# Patient Record
Sex: FEMALE | URBAN - METROPOLITAN AREA
[De-identification: names, ages, dates, MRNs, and addresses within clinical notes are randomized per-mention and may not be internally consistent; named-entity substitution may affect disease eponyms.]

---

## 2018-06-25 ENCOUNTER — APPOINTMENT (RX ONLY)
Dept: URBAN - METROPOLITAN AREA CLINIC 126 | Facility: CLINIC | Age: 48
Setting detail: DERMATOLOGY
End: 2018-06-25

## 2018-06-25 DIAGNOSIS — Z41.9 ENCOUNTER FOR PROCEDURE FOR PURPOSES OTHER THAN REMEDYING HEALTH STATE, UNSPECIFIED: ICD-10-CM

## 2018-06-25 PROCEDURE — ? MEDICAL CONSULTATION: BROWN SPOTS

## 2018-06-25 PROCEDURE — 99025: CPT

## 2018-06-25 PROCEDURE — ? MEDICAL CONSULTATION: DYNAMIC RHYTIDES

## 2018-06-25 PROCEDURE — ? MEDICAL CONSULTATION: FILLERS

## 2018-06-25 PROCEDURE — ? RECOMMENDATIONS

## 2019-12-02 ENCOUNTER — APPOINTMENT (RX ONLY)
Dept: URBAN - METROPOLITAN AREA CLINIC 126 | Facility: CLINIC | Age: 49
Setting detail: DERMATOLOGY
End: 2019-12-02

## 2019-12-02 DIAGNOSIS — Z41.9 ENCOUNTER FOR PROCEDURE FOR PURPOSES OTHER THAN REMEDYING HEALTH STATE, UNSPECIFIED: ICD-10-CM

## 2019-12-02 PROCEDURE — ? BOTOX

## 2019-12-02 PROCEDURE — ? FILLERS

## 2019-12-02 NOTE — PROCEDURE: FILLERS
Additional Area 4 Volume In Cc: 0
Include Cannula Information In Note?: No
Use Map Statement For Sites (Optional): Yes
Lot #: [id number]
Additional Area 1 Location: lip lines upper and lower
Additional Area 1 Location: earlobes
Expiration Date (Month Year): 7/31/2021
Map Statment: See [address] for Complete Details
Additional Area 1 Location: lip lines
Additional Area 2 Location: lip volume
Additional Area 2 Volume In Cc: 0.4
Additional Area 2 Location: upper lip lines
Additional Area 3 Location: lip borders
Filler: Juvederm Ultra XC
Additional Area 3 Location: corners of mouth
Additional Area 4 Location: corners
Additional Area 4 Location: chin lines
Additional Area 5 Location: static lines
Nasolabial Folds Filler Volume In Cc: 0.2
Additional Area 5 Location: lower face lines
Consent: Written consent obtained. Risks include but not limited to bruising, beading, irregular texture, ulceration, infection, allergic reaction, scar formation, incomplete augmentation, temporary nature, procedural pain.
Lot #: [id number]
Post-Care Instructions: Patient instructed to apply ice to reduce swelling.
Detail Level: Zone
Lot #: [id number]
Lot #: [id number]
Expiration Date (Month Year): 10/03/2020
Expiration Date (Month Year): 10/16/20
Expiration Date (Month Year): 4/20/2020

## 2019-12-02 NOTE — PROCEDURE: BOTOX
Show Additional Area 3: Yes
Right Periorbital Units: 0
Show Mentalis Units: No
Expiration Date (Month Year): 4/2022
Additional Area 1 Location: brow lift
Post-Care Instructions: Patient instructed to not lie down for 4 hours and limit physical activity for 24 hours. Patient instructed not to travel by airplane for 48 hours.
Forehead Units: 10
Additional Area 4 Location: upper lip lines
Dilution (U/0.1 Cc): 1
Additional Area 6 Location: upper forehead
Additional Area 3 Location: bunnies
Additional Area 2 Location: crows feet
Consent: Written consent obtained. Risks include but not limited to lid/brow ptosis, bruising, swelling, diplopia, temporary effect, incomplete chemical denervation.
Additional Area 5 Location: parentheses
Detail Level: Simple
Lot #: [id number]

## 2019-12-16 ENCOUNTER — APPOINTMENT (RX ONLY)
Dept: URBAN - METROPOLITAN AREA CLINIC 126 | Facility: CLINIC | Age: 49
Setting detail: DERMATOLOGY
End: 2019-12-16

## 2019-12-16 DIAGNOSIS — Z41.9 ENCOUNTER FOR PROCEDURE FOR PURPOSES OTHER THAN REMEDYING HEALTH STATE, UNSPECIFIED: ICD-10-CM

## 2019-12-16 PROCEDURE — ? COSMETIC FOLLOW-UP

## 2021-03-17 ENCOUNTER — APPOINTMENT (RX ONLY)
Dept: URBAN - METROPOLITAN AREA CLINIC 126 | Facility: CLINIC | Age: 51
Setting detail: DERMATOLOGY
End: 2021-03-17

## 2021-03-17 DIAGNOSIS — Z41.9 ENCOUNTER FOR PROCEDURE FOR PURPOSES OTHER THAN REMEDYING HEALTH STATE, UNSPECIFIED: ICD-10-CM

## 2021-03-17 PROCEDURE — ? BOTOX

## 2021-03-17 PROCEDURE — ? FILLERS

## 2021-03-17 NOTE — PROCEDURE: BOTOX
Right Pupillary Line Units: 0
Show Additional Area 4: Yes
Detail Level: Simple
Additional Area 1 Units: 4
Dilution (U/0.1 Cc): 1
Additional Area 3 Location: bunnies
Show Ucl Units: No
Additional Area 6 Location: axillae
Additional Area 5 Location: lateral forehead
Periorbital Skin Units: [address] [address]
Consent: Written consent obtained. Risks include but not limited to lid/brow ptosis, bruising, swelling, diplopia, temporary effect, incomplete chemical denervation.
Additional Area 2 Location: parentheses
Forehead Units: 8
Additional Area 4 Location: upper lip lines
Additional Area 1 Location: brow lift
Lot #: [id number]
Expiration Date (Month Year): 3/2023
Post-Care Instructions: Patient instructed to not lie down for 4 hours and limit physical activity for 24 hours. Patient instructed not to travel by airplane for 48 hours.

## 2021-03-17 NOTE — PROCEDURE: FILLERS
Additional Area 4 Volume In Cc: 0
Include Cannula Information In Note?: No
Use Map Statement For Sites (Optional): Yes
Lot #: [id number]
Additional Area 1 Location: lip lines upper
Additional Area 1 Volume In Cc: 0.2
Additional Area 1 Location: earlobes
Expiration Date (Month Year): 7/31/2021
Map Statment: See [address] for Complete Details
Additional Area 1 Location: lip lines
Additional Area 2 Location: lip volume
Additional Area 2 Location: upper lip lines
Additional Area 3 Location: lip borders
Filler: Juvederm Ultra XC
Additional Area 3 Location: corners of mouth
Additional Area 4 Location: corners
Additional Area 4 Location: chin lines
Additional Area 5 Location: smile lines
Additional Area 5 Location: lower face lines
Consent: Written consent obtained. Risks include but not limited to bruising, beading, irregular texture, ulceration, infection, allergic reaction, scar formation, incomplete augmentation, temporary nature, procedural pain.
Lot #: [id number]
Post-Care Instructions: Patient instructed to apply ice to reduce swelling.
Detail Level: Zone
Lot #: [id number]
Lot #: [id number]
Expiration Date (Month Year): 2/08/2022
Expiration Date (Month Year): 10/16/20
Expiration Date (Month Year): 4/20/2020

## 2021-12-20 ENCOUNTER — APPOINTMENT (RX ONLY)
Dept: URBAN - METROPOLITAN AREA CLINIC 126 | Facility: CLINIC | Age: 51
Setting detail: DERMATOLOGY
End: 2021-12-20

## 2021-12-20 DIAGNOSIS — L81.4 OTHER MELANIN HYPERPIGMENTATION: ICD-10-CM

## 2021-12-20 DIAGNOSIS — Z41.9 ENCOUNTER FOR PROCEDURE FOR PURPOSES OTHER THAN REMEDYING HEALTH STATE, UNSPECIFIED: ICD-10-CM

## 2021-12-20 PROCEDURE — ? DIAGNOSIS COMMENT

## 2021-12-20 PROCEDURE — ? FILLERS

## 2021-12-20 NOTE — PROCEDURE: FILLERS
Vermilion Lips Filler Volume In Cc: 0
Topical Anesthesia?: BLT cream (benzocaine 20%, lidocaine 6%, tetracaine 4%)
Additional Area 1 Location: corners
Lot #: [id number]
Include Cannula Information In Note?: No
Additional Area 1 Volume In Cc: 0.2
Additional Area 2 Location: smile lines
Consent: Written consent obtained. Risks include but not limited to bruising, beading, irregular texture, ulceration, infection, allergic reaction, scar formation, incomplete augmentation, temporary nature, procedural pain.
Expiration Date (Month Year): 7/7/2022
Lot #: [id number]
Filler: Radiesse+
Post-Care Instructions: Patient instructed to apply ice to reduce swelling.
Additional Area 3 Location: lip volume
Expiration Date (Month Year): 2/08/2022
Lot #: [id number]
Additional Area 4 Location: chin
Expiration Date (Month Year): 7/31/2021
Additional Area 5 Location: upper and lower lip lines
Detail Level: Zone
Lot #: [id number]
Additional Area 1 Location: lip lines
Expiration Date (Month Year): 08/10/2022
Additional Area 2 Location: lip borders
Use Map Statement For Sites (Optional): Yes
Map Statment: See [address] for Complete Details

## 2021-12-20 NOTE — PROCEDURE: DIAGNOSIS COMMENT
Detail Level: Simple
Comment: Patient to schedule appointment for BBL for face.
Render Risk Assessment In Note?: no

## 2022-06-04 ENCOUNTER — TELEPHONE ENCOUNTER (OUTPATIENT)
Dept: URBAN - METROPOLITAN AREA CLINIC 68 | Facility: CLINIC | Age: 52
End: 2022-06-04

## 2022-06-05 ENCOUNTER — TELEPHONE ENCOUNTER (OUTPATIENT)
Dept: URBAN - METROPOLITAN AREA CLINIC 68 | Facility: CLINIC | Age: 52
End: 2022-06-05

## 2022-06-25 ENCOUNTER — TELEPHONE ENCOUNTER (OUTPATIENT)
Age: 52
End: 2022-06-25

## 2022-06-26 ENCOUNTER — TELEPHONE ENCOUNTER (OUTPATIENT)
Age: 52
End: 2022-06-26

## 2023-05-02 NOTE — PROCEDURE: COSMETIC FOLLOW-UP
Detail Level: Zone
Treatment (Optional): Filler Injection
Treatment (Optional): Botox
[Restricted in physically strenuous activity but ambulatory and able to carry out work of a light or sedentary nature] : Status 1- Restricted in physically strenuous activity but ambulatory and able to carry out work of a light or sedentary nature, e.g., light house work, office work
[Normal] : affect appropriate

## 2023-06-15 ENCOUNTER — APPOINTMENT (RX ONLY)
Dept: URBAN - METROPOLITAN AREA CLINIC 126 | Facility: CLINIC | Age: 53
Setting detail: DERMATOLOGY
End: 2023-06-15

## 2023-06-15 DIAGNOSIS — Z41.9 ENCOUNTER FOR PROCEDURE FOR PURPOSES OTHER THAN REMEDYING HEALTH STATE, UNSPECIFIED: ICD-10-CM

## 2023-06-15 PROCEDURE — ? SIGNATURE HYDRAFACIAL

## 2023-06-15 PROCEDURE — ? ADDITIONAL NOTES

## 2023-06-15 ASSESSMENT — LOCATION DETAILED DESCRIPTION DERM
LOCATION DETAILED: LEFT MEDIAL FOREHEAD
LOCATION DETAILED: RIGHT LATERAL FOREHEAD

## 2023-06-15 ASSESSMENT — LOCATION SIMPLE DESCRIPTION DERM
LOCATION SIMPLE: RIGHT FOREHEAD
LOCATION SIMPLE: LEFT FOREHEAD

## 2023-06-15 ASSESSMENT — LOCATION ZONE DERM: LOCATION ZONE: FACE

## 2023-06-15 NOTE — PROCEDURE: ADDITIONAL NOTES
Additional Notes: To schedule consult with Félix for Moxi BBL
Render Risk Assessment In Note?: no
Detail Level: Simple

## 2023-06-15 NOTE — PROCEDURE: SIGNATURE HYDRAFACIAL
Location: face
Solution: Activ-4
Procedure: Extraction
Vacuum Pressure Low Setting (Will Not Render If Set To 0): [address]
Vacuum Pressure High Setting (Will Not Render If Set To 0): 0
Solution: GlySal 7.5%
Vacuum Pressure Low Setting (Will Not Render If Set To 0): 16
Tip: Hydropeel Tip, Clear
Procedure: Fusion
Solution Override
Number Of Passes: 1
Solution: Beta-HD
Tip: Hydropeel Tip, Blue
Price (Use Numbers Only, No Special Characters Or $): [place] [id number]
Procedure: Boost
Number Of Passes: 2
Vacuum Pressure Low Setting (Will Not Render If Set To 0): [address] [facility]
Tip: Hydropeel Tip, Teal
Indication: anti-aging
Consent: Written consent obtained, risks reviewed including but not limited to crusting, scabbing, blistering, scarring, darker or lighter pigmentary change, bruising, and/or incomplete response.
Procedure: Exfoliation
Tip Override
Vacuum Pressure High Setting (Will Not Render If Set To 0): 22
Procedure: Peel
Procedure: Extend and Protect
Post-Care Instructions: I reviewed with the patient in detail post-care instructions. Patient should stay away from the sun and wear sun protection until treated areas are fully healed.

## 2023-07-05 ENCOUNTER — APPOINTMENT (RX ONLY)
Dept: URBAN - METROPOLITAN AREA CLINIC 126 | Facility: CLINIC | Age: 53
Setting detail: DERMATOLOGY
End: 2023-07-05

## 2023-07-05 DIAGNOSIS — Z41.9 ENCOUNTER FOR PROCEDURE FOR PURPOSES OTHER THAN REMEDYING HEALTH STATE, UNSPECIFIED: ICD-10-CM

## 2023-07-05 PROCEDURE — ? COSMETIC CONSULTATION: SCITON MOXI

## 2023-07-05 PROCEDURE — ? PHOTO-DOCUMENTATION

## 2023-07-05 PROCEDURE — ? COSMETIC CONSULTATION: BBL

## 2023-08-09 ENCOUNTER — APPOINTMENT (RX ONLY)
Dept: URBAN - METROPOLITAN AREA CLINIC 126 | Facility: CLINIC | Age: 53
Setting detail: DERMATOLOGY
End: 2023-08-09

## 2023-08-09 DIAGNOSIS — Z41.9 ENCOUNTER FOR PROCEDURE FOR PURPOSES OTHER THAN REMEDYING HEALTH STATE, UNSPECIFIED: ICD-10-CM

## 2023-08-09 PROCEDURE — ? INVENTORY

## 2023-08-09 PROCEDURE — ? SCITON BBL HERO

## 2023-08-09 PROCEDURE — ? SCITON MOXI

## 2023-08-09 PROCEDURE — ? DIAGNOSIS COMMENT

## 2023-08-09 ASSESSMENT — LOCATION SIMPLE DESCRIPTION DERM
LOCATION SIMPLE: LEFT CHEEK
LOCATION SIMPLE: CHEST

## 2023-08-09 ASSESSMENT — LOCATION ZONE DERM
LOCATION ZONE: TRUNK
LOCATION ZONE: FACE

## 2023-08-09 ASSESSMENT — LOCATION DETAILED DESCRIPTION DERM
LOCATION DETAILED: STERNAL NOTCH
LOCATION DETAILED: LEFT INFERIOR CENTRAL MALAR CHEEK
LOCATION DETAILED: LEFT CENTRAL BUCCAL CHEEK

## 2023-08-09 NOTE — PROCEDURE: SCITON MOXI
Laser Type: Fractionated 1927nm, thulium laser
Price (Use Numbers Only, No Special Characters Or $): [address] [employer]
Pre=procedure Text: After consent was obtained, the treatment areas were cleaned and treated using the parameters noted above.
Energy (J/Cm2): 15
Detail Level: Zone
Anesthesia Type: 1% lidocaine with epinephrine
Level 3
External Cooling Fan Speed: 5
Total Accumulated Energy (J): [address]
% Per Pass: 3.0
Treatment Number: 1
Consent: Written consent obtained. Risks were reviewed including but not limited to crusting, scabbing, blistering, scarring, darker or lighter pigmentary change, incomplete improvement, prolonged erythema and facial swelling, infection, and bleeding.
Post-Care Instructions: I reviewed with the patient in detail post-care instructions. Recommended diligent sun protection and sun avoidance.

## 2023-08-09 NOTE — PROCEDURE: DIAGNOSIS COMMENT
Detail Level: Zone
Render Risk Assessment In Note?: yes
Comment: Treatment #1 of 3 (pt plans on purchasing package) FACE and CHEST

## 2023-08-09 NOTE — PROCEDURE: SCITON BBL HERO
Spot Size: Finesse Adapter Size: 15 x 15 mm square
Pulse Width Units: milliseconds
Add Setting 3?: yes
Temp (C): 8[address] [facility]
Number Of Prepaid Treatments (Will Not Render If 0): 0
Add Setting 5?: no
Filter: 560nm Filter
Consent: Written consent obtained. Risks reviewed including but not limited to crusting, scabbing, blistering, scarring, darker or lighter pigmentary change, and incomplete clearance.
Fluence (J/Cm2) - Will Not Render If 0: 6
Pulse Width - Will Not Render If 0: 3
Procedure Note: After applying gel and applying protective eyeware, treatment was administered using the setting parameters listed above.
Fluence (J/Cm2) - Will Not Render If 0: 17[address] [facility]
Total Pulses (Optional): 68 cheeks
Anesthesia Type: 1% lidocaine with epinephrine
Passes (Optional): Chest
Pulse Width - Will Not Render If 0: 20
Post-Care Instructions: I reviewed with the patient in detail post-care instructions. Patient should avoid sun exposure before and after treatment. Patient should wear sunscreen.
Fluence (J/Cm2) - Will Not Render If 0: 15
Total Pulses (Optional): 43- chest
Temp (C): 25
Filter: 515nm Filter
Detail Level: Zone
Fluence (J/Cm2) - Will Not Render If 0: 9[address] [facility]
Price (Use Numbers Only, No Special Characters Or $): 765 [employer]
Total Pulses (Optional): 208- full face hero
Spot Size: Finesse Adapter Size: 7 mm round
Treatment Number: 1
Total Pulses (Optional): [address] [place]

## 2023-09-07 ENCOUNTER — APPOINTMENT (RX ONLY)
Dept: URBAN - METROPOLITAN AREA CLINIC 126 | Facility: CLINIC | Age: 53
Setting detail: DERMATOLOGY
End: 2023-09-07

## 2023-09-07 DIAGNOSIS — Z41.9 ENCOUNTER FOR PROCEDURE FOR PURPOSES OTHER THAN REMEDYING HEALTH STATE, UNSPECIFIED: ICD-10-CM

## 2023-09-07 PROCEDURE — ? INVENTORY

## 2023-09-07 PROCEDURE — ? SCITON MOXI

## 2023-09-07 PROCEDURE — ? SCITON BBL HERO

## 2023-09-07 PROCEDURE — ? DIAGNOSIS COMMENT

## 2023-09-07 ASSESSMENT — LOCATION DETAILED DESCRIPTION DERM
LOCATION DETAILED: RIGHT CENTRAL MALAR CHEEK
LOCATION DETAILED: LEFT CENTRAL MALAR CHEEK
LOCATION DETAILED: MIDDLE STERNUM
LOCATION DETAILED: RIGHT INFERIOR CENTRAL MALAR CHEEK

## 2023-09-07 ASSESSMENT — LOCATION ZONE DERM
LOCATION ZONE: FACE
LOCATION ZONE: TRUNK

## 2023-09-07 ASSESSMENT — LOCATION SIMPLE DESCRIPTION DERM
LOCATION SIMPLE: CHEST
LOCATION SIMPLE: RIGHT CHEEK
LOCATION SIMPLE: LEFT CHEEK

## 2023-09-07 NOTE — PROCEDURE: SCITON BBL HERO
Rate (Hz): 1.0
Temp (C): 3310 [facility]
Pulse Width Units: milliseconds
Spot Size: Finesse Adapter Size: 7 mm round
Pulse Width - Will Not Render If 0: 20
Passes (Optional): 400 [facility]
Spot Size: Finesse Adapter Size: 15 x 45 mm (No Finesse Adapter)
Filter: 515nm Filter
Spot Size: Finesse Adapter Size: 15 x 15 mm square
Add Setting 5?: no
Add Setting 2?: yes
Fluence (J/Cm2) - Will Not Render If 0: 8
Passes (Optional): [address] [place]
Fluence (J/Cm2) - Will Not Render If 0: 0
Pulse Width - Will Not Render If 0: 25
Price (Use Numbers Only, No Special Characters Or $): 399 [facility]
Filter: 560nm Filter
Fluence (J/Cm2) - Will Not Render If 0: 1700 [facility]
Treatment Number: 2
Consent: Written consent obtained. Risks reviewed including but not limited to crusting, scabbing, blistering, scarring, darker or lighter pigmentary change, and incomplete clearance.
Passes (Optional): 36[address] [facility]
Number Of Prepaid Treatments (Will Not Render If 0): 3
Temp (C): [address] [address]
Detail Level: Simple
Procedure Note: After applying gel and applying protective eyeware, treatment was administered using the setting parameters listed above.
Anesthesia Type: 1% lidocaine with epinephrine
Post-Care Instructions: I reviewed with the patient in detail post-care instructions. Patient should avoid sun exposure before and after treatment. Patient should wear sunscreen.
Passes (Optional): [address]
Fluence (J/Cm2) - Will Not Render If 0: 17

## 2023-09-07 NOTE — PROCEDURE: DIAGNOSIS COMMENT
Render Risk Assessment In Note?: yes
Detail Level: Zone
Comment: Treatment # 2 of 3 for BBL and Moxie on face and chest

## 2023-09-07 NOTE — PROCEDURE: SCITON MOXI
Pre=procedure Text: After consent was obtained, the treatment areas were cleaned and treated using the parameters noted above.
Post-Care Instructions: I reviewed with the patient in detail post-care instructions. Recommended diligent sun protection and sun avoidance.
Anesthesia Type: 1% lidocaine with epinephrine
Total Accumulated Energy (J): [address] [place]
Level 3
Treatment Number: 2
Laser Type: Fractionated 1927nm, thulium laser
External Cooling Fan Speed: 5
Total Coverage (%): [phone] [facility]
Length Of Topical Anesthesia Application (Optional): 30 minutes
Topical Anesthesia?: BLT gel (benzocaine 20%, lidocaine 10%, tetracaine 10%)
Detail Level: Detailed
Consent: Written consent obtained. Risks were reviewed including but not limited to crusting, scabbing, blistering, scarring, darker or lighter pigmentary change, incomplete improvement, prolonged erythema and facial swelling, infection, and bleeding.
% Per Pass: 3.3
Number Of Passes: 6
Energy (J/Cm2): 20

## 2023-09-19 ENCOUNTER — APPOINTMENT (RX ONLY)
Dept: URBAN - METROPOLITAN AREA CLINIC 126 | Facility: CLINIC | Age: 53
Setting detail: DERMATOLOGY
End: 2023-09-19

## 2023-09-19 DIAGNOSIS — Z41.9 ENCOUNTER FOR PROCEDURE FOR PURPOSES OTHER THAN REMEDYING HEALTH STATE, UNSPECIFIED: ICD-10-CM

## 2023-09-19 PROCEDURE — ? BOTOX

## 2023-09-19 PROCEDURE — ? FILLERS

## 2023-09-19 ASSESSMENT — LOCATION ZONE DERM
LOCATION ZONE: FACE
LOCATION ZONE: LIP

## 2023-09-19 ASSESSMENT — LOCATION SIMPLE DESCRIPTION DERM
LOCATION SIMPLE: LEFT LIP
LOCATION SIMPLE: GLABELLA

## 2023-09-19 ASSESSMENT — LOCATION DETAILED DESCRIPTION DERM
LOCATION DETAILED: LEFT SUPERIOR VERMILION LIP
LOCATION DETAILED: GLABELLA

## 2023-09-19 NOTE — PROCEDURE: BOTOX
Glabellar Complex Units: [address] [facility]
R Brow Units: 0
Show Additional Area 3: Yes
Show Inventory Tab: Show
Show Right And Left Pupillary Line Units: No
Periorbital Skin Units: 16
Consent: Written consent obtained. Risks include but not limited to lid/brow ptosis, bruising, swelling, diplopia, temporary effect, incomplete chemical denervation.
Incrementing Botox Units: By 0.5 Units
Additional Area 1 Location: chin
Detail Level: Detailed
Dilution (U/0.1 Cc): 4
Post-Care Instructions: Patient instructed to not lie down for 4 hours and limit physical activity for 24 hours.

## 2023-09-19 NOTE — PROCEDURE: FILLERS
Show Inventory Tab: Show
Additional Area 2 Location: oral commissures
Additional Area 1 Volume In Cc: 0
Include Cannula Information In Note?: No
Filler: Juvederm Ultra Plus XC
Additional Area 3 Location: lip pillows
Consent: Written consent obtained. Risks include but not limited to bruising, beading, irregular texture, ulceration, infection, allergic reaction, scar formation, incomplete augmentation, temporary nature, procedural pain.
Number Of Syringes (Required For Inventory): 1
Additional Area 3 Volume In Cc: 0.2
Post-Care Instructions: Patient instructed to apply ice to reduce swelling.
Map Statment: See [address] for Complete Details
Marionette Lines Filler  Volume In Cc: 0.4
Anesthesia Type: 1% lidocaine with epinephrine
Anesthesia Volume In Cc: 0.5
Inventory Information: This plan will send filler information to inventory based on the fillers you select. Multiple fillers can be sent but you must ensure you select the appropriate fillers in the inventory tab.
Additional Anesthesia Volume In Cc: 6
Additional Area 1 Location: upper lip lines
Detail Level: Detailed

## 2023-10-05 ENCOUNTER — APPOINTMENT (RX ONLY)
Dept: URBAN - METROPOLITAN AREA CLINIC 126 | Facility: CLINIC | Age: 53
Setting detail: DERMATOLOGY
End: 2023-10-05

## 2023-10-05 DIAGNOSIS — Z41.9 ENCOUNTER FOR PROCEDURE FOR PURPOSES OTHER THAN REMEDYING HEALTH STATE, UNSPECIFIED: ICD-10-CM

## 2023-10-05 PROCEDURE — ? SCITON MOXI

## 2023-10-05 PROCEDURE — ? SCITON BBL HERO

## 2023-10-05 PROCEDURE — ? INVENTORY

## 2023-10-05 ASSESSMENT — LOCATION SIMPLE DESCRIPTION DERM
LOCATION SIMPLE: CHEST
LOCATION SIMPLE: LEFT CHEEK
LOCATION SIMPLE: RIGHT CHEEK

## 2023-10-05 ASSESSMENT — LOCATION DETAILED DESCRIPTION DERM
LOCATION DETAILED: RIGHT CENTRAL MALAR CHEEK
LOCATION DETAILED: MIDDLE STERNUM
LOCATION DETAILED: RIGHT INFERIOR CENTRAL MALAR CHEEK
LOCATION DETAILED: LEFT CENTRAL MALAR CHEEK

## 2023-10-05 ASSESSMENT — LOCATION ZONE DERM
LOCATION ZONE: FACE
LOCATION ZONE: TRUNK

## 2023-10-05 NOTE — PROCEDURE: SCITON MOXI
Pre=procedure Text: After consent was obtained, the treatment areas were cleaned and treated using the parameters noted above.
Post-Care Instructions: I reviewed with the patient in detail post-care instructions. Recommended diligent sun protection and sun avoidance.
Total Accumulated Energy (J): face [address]
Level 3
Treatment Number: 2
Laser Type: Fractionated 1927nm, thulium laser
External Cooling Fan Speed: 5
Total Coverage (%): 15
Length Of Topical Anesthesia Application (Optional): 30 minutes
Topical Anesthesia?: BLT gel (benzocaine 20%, lidocaine 10%, tetracaine 10%)
Detail Level: Detailed
Consent: Written consent obtained. Risks were reviewed including but not limited to crusting, scabbing, blistering, scarring, darker or lighter pigmentary change, incomplete improvement, prolonged erythema and facial swelling, infection, and bleeding.
% Per Pass: 3.3
Number Of Passes: 6

## 2023-10-05 NOTE — PROCEDURE: SCITON BBL HERO
Rate (Hz): 1.0
Temp (C): 2267 [facility]
Pulse Width Units: milliseconds
Spot Size: Finesse Adapter Size: 15 x 45 mm (No Finesse Adapter)
Pulse Width - Will Not Render If 0: 20
Total Pulses (Optional): 665 face and chest
Filter: 515nm Filter
Spot Size: Finesse Adapter Size: 15 x 15 mm square
Total Pulses (Optional): 244 face
Fluence (J/Cm2) - Will Not Render If 0: 6
Add Setting 5?: no
Add Setting 2?: yes
Fluence (J/Cm2) - Will Not Render If 0: 15
Pulse Width - Will Not Render If 0: 3
Fluence (J/Cm2) - Will Not Render If 0: 0
Total Pulses (Optional): chest-83
Price (Use Numbers Only, No Special Characters Or $): 191 [facility]
Filter: 560nm Filter
Temp (C): 25
Consent: Written consent obtained. Risks reviewed including but not limited to crusting, scabbing, blistering, scarring, darker or lighter pigmentary change, and incomplete clearance.
Spot Size: Finesse Adapter Size: 7 mm round
Detail Level: Simple
Procedure Note: After applying gel and applying protective eyeware, treatment was administered using the setting parameters listed above.
Rate (Hz): 4
Total Pulses (Optional): 41 face
Anesthesia Type: 1% lidocaine with epinephrine
Post-Care Instructions: I reviewed with the patient in detail post-care instructions. Patient should avoid sun exposure before and after treatment. Patient should wear sunscreen.
Fluence (J/Cm2) - Will Not Render If 0: 17[facility]

## 2024-01-23 ENCOUNTER — APPOINTMENT (RX ONLY)
Dept: URBAN - METROPOLITAN AREA CLINIC 126 | Facility: CLINIC | Age: 54
Setting detail: DERMATOLOGY
End: 2024-01-23

## 2024-01-23 DIAGNOSIS — Z41.9 ENCOUNTER FOR PROCEDURE FOR PURPOSES OTHER THAN REMEDYING HEALTH STATE, UNSPECIFIED: ICD-10-CM

## 2024-01-23 PROCEDURE — ? SCITON BBL

## 2024-01-23 PROCEDURE — ? COSMETIC CONSULTATION: THREAD LIFT

## 2024-01-23 PROCEDURE — ? SCITON MOXI

## 2024-01-23 PROCEDURE — ? PRESCRIPTION

## 2024-01-23 PROCEDURE — ? TREATMENT REGIMEN

## 2024-01-23 PROCEDURE — ? INVENTORY

## 2024-01-23 RX ORDER — PHARMACY COMPOUNDING ACCESSORY
EACH MISCELLANEOUS
Qty: 30 | Refills: 3 | Status: ERX | COMMUNITY
Start: 2024-01-23

## 2024-01-23 RX ORDER — PHARMACY COMPOUNDING ACCESSORY
EACH MISCELLANEOUS
Qty: 30 | Refills: 3 | Status: ERX

## 2024-01-23 RX ADMIN — Medication: at 00:00

## 2024-01-23 ASSESSMENT — LOCATION DETAILED DESCRIPTION DERM
LOCATION DETAILED: LEFT CENTRAL MALAR CHEEK
LOCATION DETAILED: RIGHT SUPERIOR MEDIAL BUCCAL CHEEK
LOCATION DETAILED: RIGHT INFERIOR CENTRAL MALAR CHEEK
LOCATION DETAILED: LEFT INFERIOR CENTRAL MALAR CHEEK

## 2024-01-23 ASSESSMENT — LOCATION SIMPLE DESCRIPTION DERM
LOCATION SIMPLE: LEFT CHEEK
LOCATION SIMPLE: RIGHT CHEEK

## 2024-01-23 ASSESSMENT — LOCATION ZONE DERM: LOCATION ZONE: FACE

## 2024-01-23 NOTE — PROCEDURE: SCITON MOXI
Number Of Passes: 6
% Per Pass: 3.0
Detail Level: Detailed
Pre=procedure Text: After consent was obtained, the treatment areas were cleaned and treated using the parameters noted above.
Energy (J/Cm2): 20
Post-Care Instructions: I reviewed with the patient in detail post-care instructions.  Recommended diligent sun protection and sun avoidance.
External Cooling Fan Speed: 5
Total Accumulated Energy (J): 819
Anesthesia Type: 1% lidocaine with epinephrine
Level 3
Treatment Number: 4
Laser Type: Fractionated 1927nm, thulium laser
Topical Anesthesia?: BLT cream (benzocaine 20%, lidocaine 10%, tetracaine 10%)
Consent: Written consent obtained.  Risks were reviewed including but not limited to crusting, scabbing, blistering, scarring, darker or lighter pigmentary change, incomplete improvement, prolonged erythema and facial swelling, infection, and bleeding.

## 2024-01-23 NOTE — PROCEDURE: SCITON BBL
Fluence (J/Cm2): 17
Cooling (In C): 15
Fluence (J/Cm2): 12
Preprocedure Text: The treatment areas were thoroughly cleaned. Any exposed at risk hair that was not to be treated was covered in white paper tape. Clear ultrasound gel was applied to the treatment area. The area was treated with no immediate stacking of pulses.
Post Procedure Text: The patient tolerated the procedure well. Ice-chilled washclothes were applied to the treatment area for comfort. Post care was reviewed with the patient.
Pulse Duration: 20
Passes: 1
Repetition Rate (Hz): 10
Total Pulses (Optional): 58
Cooling ?: Yes
Fluence (J/Cm2): 23
Pulse Duration Units: milliseconds
Fluence (J/Cm2): 25
Detail Level: Zone
Spot Size: Finesse Adapter Size: 15 x 15 mm square
Spot Size: Finesse Adapter Size: 7 mm round
Total Pulses (Optional): 57
Total Pulses (Optional): 50
Consent: Written consent obtained, risks reviewed including but not limited to crusting, scabbing, blistering, scarring, darker or lighter pigmentary change, bruising, and/or incomplete response.
Post-Care Instructions: I reviewed with the patient in detail post-care instructions. Patient should stay away from the sun and wear sun protection until treated areas are fully healed.
Anesthesia Type: 1% lidocaine with epinephrine
Treatment Number: 4
Anesthesia Volume In Cc: 0
Hide Repetition Rate?: No

## 2024-05-16 ENCOUNTER — APPOINTMENT (RX ONLY)
Dept: URBAN - METROPOLITAN AREA CLINIC 126 | Facility: CLINIC | Age: 54
Setting detail: DERMATOLOGY
End: 2024-05-16

## 2024-05-16 DIAGNOSIS — Z41.9 ENCOUNTER FOR PROCEDURE FOR PURPOSES OTHER THAN REMEDYING HEALTH STATE, UNSPECIFIED: ICD-10-CM

## 2024-05-16 PROCEDURE — ? FILLERS

## 2024-05-16 PROCEDURE — ? BOTOX

## 2024-05-16 NOTE — PROCEDURE: FILLERS
Additional Area 4 Volume In Cc: 0
Include Cannula Information In Note?: No
Additional Anesthesia Volume In Cc: 6
Inventory Information: This plan will send filler information to inventory based on the fillers you select. Multiple fillers can be sent but you must ensure you select the appropriate fillers in the inventory tab.
Number Of Syringes (Required For Inventory): 1
Detail Level: Detailed
Show Inventory Tab: Show
Filler: Juvederm Ultra Plus XC
Consent: Written consent obtained. Risks include but not limited to bruising, beading, irregular texture, ulceration, infection, allergic reaction, scar formation, incomplete augmentation, temporary nature, procedural pain.
Additional Area 1 Location: upper lip lines
Post-Care Instructions: Patient instructed to apply ice to reduce swelling.
Additional Area 2 Location: oral commissures
Additional Area 3 Location: acne scars cheeks
Additional Area 2 Volume In Cc: 0.2
Map Statment: See Attach Map for Complete Details
Marionette Lines Filler Volume In Cc: 0.4
Anesthesia Type: 1% lidocaine with epinephrine
Anesthesia Volume In Cc: 0.5

## 2024-09-30 ENCOUNTER — APPOINTMENT (RX ONLY)
Dept: URBAN - METROPOLITAN AREA CLINIC 126 | Facility: CLINIC | Age: 54
Setting detail: DERMATOLOGY
End: 2024-09-30

## 2024-09-30 DIAGNOSIS — Z41.9 ENCOUNTER FOR PROCEDURE FOR PURPOSES OTHER THAN REMEDYING HEALTH STATE, UNSPECIFIED: ICD-10-CM

## 2024-09-30 PROCEDURE — ? BOTOX

## 2024-09-30 PROCEDURE — ? FILLERS

## 2024-09-30 PROCEDURE — ? PRESCRIPTION

## 2024-09-30 RX ORDER — TRETIONIN 0.25 MG/G
CREAM TOPICAL
Qty: 45 | Refills: 3 | Status: ERX | COMMUNITY
Start: 2024-09-30

## 2024-09-30 RX ADMIN — TRETIONIN: 0.25 CREAM TOPICAL at 00:00

## 2024-09-30 NOTE — PROCEDURE: FILLERS
Mid Face Filler Volume In Cc: 0
Additional Area 3 Location: acne scars cheeks
Number Of Syringes (Required For Inventory): 1
Include Cannula Information In Note?: No
Cheeks Filler Volume In Cc: 0.4
Nasolabial Folds Filler Volume In Cc: 0.2
Map Statment: See Attach Map for Complete Details
Additional Area 1 Location: jaw
Anesthesia Type: 1% lidocaine with epinephrine
Additional Area 2 Location: chin
Additional Area 1 Location: radial cheek lines
Anesthesia Volume In Cc: 0.5
Consent: Written consent obtained. Risks include but not limited to bruising, beading, irregular texture, ulceration, infection, allergic reaction, scar formation, incomplete augmentation, temporary nature, procedural pain.
Post-Care Instructions: Patient instructed to apply ice to reduce swelling.
Additional Anesthesia Volume In Cc: 6
Inventory Information: This plan will send filler information to inventory based on the fillers you select. Multiple fillers can be sent but you must ensure you select the appropriate fillers in the inventory tab.
Additional Area 1 Location: upper lip lines
Detail Level: Detailed
Show Inventory Tab: Show
Filler: Juvederm Ultra Plus XC
Additional Area 2 Location: oral commissures

## 2024-09-30 NOTE — PROCEDURE: BOTOX
Glabellar Complex Units: 8
Inferior Lateral Orbicularis Oculi Units: 0
Show Right And Left Pupillary Line Units: No
Show Anterior Platysmal Band Units: Yes
Detail Level: Detailed
Additional Area 2 Location: under eye
Incrementing Botox Units: By 0.5 Units
Additional Area 2 Units: 4
Consent: Written consent obtained. Risks include but not limited to lid/brow ptosis, bruising, swelling, diplopia, temporary effect, incomplete chemical denervation.
Post-Care Instructions: Patient instructed to not lie down for 4 hours and limit physical activity for 24 hours.
Show Inventory Tab: Show
Additional Area 1 Location: chin
Additional Area 1 Units: 6
Additional Area 3 Location: upper lip
Periorbital Skin Units: 16

## 2025-04-24 ENCOUNTER — APPOINTMENT (OUTPATIENT)
Dept: URBAN - METROPOLITAN AREA CLINIC 126 | Facility: CLINIC | Age: 55
Setting detail: DERMATOLOGY
End: 2025-04-24

## 2025-04-24 DIAGNOSIS — Z41.9 ENCOUNTER FOR PROCEDURE FOR PURPOSES OTHER THAN REMEDYING HEALTH STATE, UNSPECIFIED: ICD-10-CM

## 2025-04-24 PROCEDURE — ? PRESCRIPTION

## 2025-04-24 PROCEDURE — ? BOTOX

## 2025-04-24 PROCEDURE — ? FILLERS

## 2025-04-24 RX ORDER — TRETIONIN 0.5 MG/G
CREAM TOPICAL QHS
Qty: 45 | Refills: 5 | Status: ERX | COMMUNITY
Start: 2025-04-24

## 2025-04-24 RX ADMIN — TRETIONIN: 0.5 CREAM TOPICAL at 00:00

## 2025-04-24 NOTE — PROCEDURE: BOTOX
Inferior Lateral Orbicularis Oculi Units: 0
Incrementing Botox Units: By 0.5 Units
Additional Area 2 Units: 4
Show Right And Left Brow Units: No
Show Depressor Anguli Units: Yes
Detail Level: Detailed
Forehead Units: 8
Consent: Written consent obtained. Risks include but not limited to lid/brow ptosis, bruising, swelling, diplopia, temporary effect, incomplete chemical denervation.
Show Inventory Tab: Show
Post-Care Instructions: Patient instructed to not lie down for 4 hours and limit physical activity for 24 hours.
Additional Area 3 Location: upper lip
Periorbital Skin Units: 16
Additional Area 1 Location: chin
Additional Area 1 Units: 6
Additional Area 2 Location: undereye

## 2025-04-24 NOTE — PROCEDURE: FILLERS
Additional Area 1 Volume In Cc: 0
Show Inventory Tab: Show
Number Of Syringes (Required For Inventory): 1
Additional Area 1 Location: jaw
Filler: Juvederm Ultra Plus XC
Vermilion Lips Filler Volume In Cc: 0.2
Include Cannula Information In Note?: No
Cheeks Filler Volume In Cc: 0.4
Additional Area 2 Location: chin
Consent: Written consent obtained. Risks include but not limited to bruising, beading, irregular texture, ulceration, infection, allergic reaction, scar formation, incomplete augmentation, temporary nature, procedural pain.
Map Statment: See Attach Map for Complete Details
Post-Care Instructions: Patient instructed to apply ice to reduce swelling.
Anesthesia Type: 1% lidocaine with epinephrine
Additional Area 1 Location: radial cheek lines
Anesthesia Volume In Cc: 0.5
Additional Area 1 Location: upper lip lines
Additional Anesthesia Volume In Cc: 6
Additional Area 2 Location: oral commissures
Inventory Information: This plan will send filler information to inventory based on the fillers you select. Multiple fillers can be sent but you must ensure you select the appropriate fillers in the inventory tab.
Detail Level: Detailed
Additional Area 3 Location: mentalis crease